# Patient Record
Sex: MALE | Race: WHITE | NOT HISPANIC OR LATINO
[De-identification: names, ages, dates, MRNs, and addresses within clinical notes are randomized per-mention and may not be internally consistent; named-entity substitution may affect disease eponyms.]

---

## 2021-05-10 PROBLEM — Z00.00 ENCOUNTER FOR PREVENTIVE HEALTH EXAMINATION: Status: ACTIVE | Noted: 2021-05-10

## 2021-06-14 ENCOUNTER — APPOINTMENT (OUTPATIENT)
Dept: OTOLARYNGOLOGY | Facility: CLINIC | Age: 56
End: 2021-06-14
Payer: MEDICAID

## 2021-06-14 ENCOUNTER — NON-APPOINTMENT (OUTPATIENT)
Age: 56
End: 2021-06-14

## 2021-06-14 VITALS
DIASTOLIC BLOOD PRESSURE: 97 MMHG | SYSTOLIC BLOOD PRESSURE: 150 MMHG | HEIGHT: 66.5 IN | BODY MASS INDEX: 25.41 KG/M2 | WEIGHT: 160 LBS | HEART RATE: 87 BPM | TEMPERATURE: 97.6 F

## 2021-06-14 PROCEDURE — 99204 OFFICE O/P NEW MOD 45 MIN: CPT | Mod: 25

## 2021-06-14 PROCEDURE — 99072 ADDL SUPL MATRL&STAF TM PHE: CPT

## 2021-06-14 PROCEDURE — 31237 NSL/SINS NDSC SURG BX POLYPC: CPT | Mod: 50

## 2021-06-14 RX ORDER — AMOXICILLIN 500 MG/1
500 CAPSULE ORAL
Qty: 28 | Refills: 0 | Status: ACTIVE | COMMUNITY
Start: 2021-04-23

## 2021-06-14 RX ORDER — PREDNISONE 10 MG/1
10 TABLET ORAL
Qty: 27 | Refills: 0 | Status: ACTIVE | COMMUNITY
Start: 2021-06-14 | End: 1900-01-01

## 2021-06-14 RX ORDER — OXYCODONE AND ACETAMINOPHEN 5; 325 MG/1; MG/1
5-325 TABLET ORAL
Qty: 20 | Refills: 0 | Status: ACTIVE | COMMUNITY
Start: 2021-04-30

## 2021-06-14 RX ORDER — AMOXICILLIN AND CLAVULANATE POTASSIUM 875; 125 MG/1; MG/1
875-125 TABLET, COATED ORAL
Qty: 28 | Refills: 0 | Status: ACTIVE | COMMUNITY
Start: 2021-04-23

## 2021-06-14 RX ORDER — IBUPROFEN 800 MG/1
800 TABLET, FILM COATED ORAL
Qty: 28 | Refills: 0 | Status: ACTIVE | COMMUNITY
Start: 2021-04-23

## 2021-06-14 RX ORDER — METHYLPREDNISOLONE 4 MG/1
4 TABLET ORAL
Qty: 21 | Refills: 0 | Status: ACTIVE | COMMUNITY
Start: 2021-04-18

## 2021-06-14 RX ORDER — AMLODIPINE BESYLATE 5 MG/1
5 TABLET ORAL
Qty: 90 | Refills: 0 | Status: ACTIVE | COMMUNITY
Start: 2021-04-29

## 2021-06-14 RX ORDER — AMOXICILLIN AND CLAVULANATE POTASSIUM 875; 125 MG/1; MG/1
875-125 TABLET, COATED ORAL TWICE DAILY
Qty: 28 | Refills: 0 | Status: ACTIVE | COMMUNITY
Start: 2021-06-14 | End: 1900-01-01

## 2021-06-14 RX ORDER — APREMILAST 30 MG/1
30 TABLET, FILM COATED ORAL
Qty: 60 | Refills: 0 | Status: ACTIVE | COMMUNITY
Start: 2021-04-21

## 2021-06-14 NOTE — PHYSICAL EXAM
[Midline] : trachea located in midline position [Normal] : no rashes [de-identified] : purulence right SMG on firm massage, unable to feel clear stone but gland inflamed, purulance cultured

## 2021-06-14 NOTE — CONSULT LETTER
[FreeTextEntry1] : Dear Dr. BRENDA MOODY \par I had the pleasure of evaluating your patient JEROD TIERNEY, thank you for allowing us to participate in their care. please see full note detailing our visit below.\par If you have any questions, please do not hesitate to call me and I would be happy to discuss further. \par \par Thien Spencer M.D.\par Attending Physician,  \par Department of Otolaryngology - Head and Neck Surgery\par [facility] \par Office: [phone]\par Fax: [phone]\par \par

## 2021-06-14 NOTE — PROCEDURE
[FreeTextEntry6] : procedure - bilateral endoscopic nasal  debridement\par Bilateral nasal cavities inspected with #0 ridged sinus endoscope. septum with massive crusting, abnormal mucosa. Bilateral middle meatuses were explored and everything cleared out suction and agitator airway cleared \par purulence left maxilalry

## 2021-06-14 NOTE — ASSESSMENT
[FreeTextEntry1] : pt with chronic sinusitis with nasl congestion with what looks like destroyed nasal lining, polyps\par purulence left maxillary \par We will proceed with a regiment of decongestants, antibiotics and irrigation to try to break the cycle of inflation and obstruction. this will treat the acute symptoms and will hopefully allow for maintenance therapy to reach disease areas and avoid further intervention.\par Ponaris oil\par saline gel\par get copy of images \par \par \par right SMG with 2 months sx, got one course of Tx - improved but not resolved still with issues\par abx and steroids\par Discussed options for recurrent submandibular sialoadenitis- observation with conservative management versus interventional sialoendoscopy vs excision of gland. \par Discussed details of the regiment/procedures and risks of each including but not limited to:\par interventional sialoendoscopy - bleeding, infection, scarring, inability to remove stone, ductal perforation/avulsion, injury to surrounding nerves, seroma, persistent sx \par Combined approach - increased risk of bleeding and infections injury to surrounding nerves including lingual, hypoglossal and sensory nerves as well as megaduct with large stone \par complete gland removal - external scar, injury to marginal mandibular nerve etc. \par Would like to do sialoendoscopy. Will book for procedure.\par will need to cool down infection first\par will see if can feel stone in duct next visit after Tx

## 2021-06-14 NOTE — HISTORY OF PRESENT ILLNESS
[de-identified] : pt with 2 months right sided SMG swelling \par sent for a CT -- .85 cm right smg stone, got abx in the ER as well\par 1.5 weeks went down, still giving pain intermittently \par not sure what brings it on \par is tender\par no dry mouth/ eyes\par re-doing teeth  \par \par massive nasal crusting, obstruction, sinu pressure and infection \par gets lots of crusts out\par Hx cocaine use and sinus surgery x2 last one in 2003\par sinus polyp disease on the scan

## 2021-06-23 LAB — BACTERIA FLD CULT: ABNORMAL

## 2021-08-05 ENCOUNTER — APPOINTMENT (OUTPATIENT)
Dept: OTOLARYNGOLOGY | Facility: CLINIC | Age: 56
End: 2021-08-05
Payer: MEDICAID

## 2021-08-05 VITALS
HEART RATE: 88 BPM | TEMPERATURE: 98 F | SYSTOLIC BLOOD PRESSURE: 145 MMHG | HEIGHT: 66.5 IN | WEIGHT: 160 LBS | DIASTOLIC BLOOD PRESSURE: 88 MMHG | BODY MASS INDEX: 25.41 KG/M2

## 2021-08-05 PROCEDURE — 31231 NASAL ENDOSCOPY DX: CPT

## 2021-08-05 PROCEDURE — 92557 COMPREHENSIVE HEARING TEST: CPT

## 2021-08-05 PROCEDURE — 99214 OFFICE O/P EST MOD 30 MIN: CPT | Mod: 25

## 2021-08-05 PROCEDURE — 92567 TYMPANOMETRY: CPT

## 2021-08-05 NOTE — CONSULT LETTER
[Please see my note below.] : Please see my note below. [FreeTextEntry1] : Dear Dr. BRENDA MOODY \par I had the pleasure of evaluating your patient JEROD TIERNEY, thank you for allowing us to participate in their care. please see full note detailing our visit below.\par If you have any questions, please do not hesitate to call me and I would be happy to discuss further. \par \par Thien Spencer M.D.\par Attending Physician,  \par Department of Otolaryngology - Head and Neck Surgery\par [facility] \par Office: [phone]\par Fax: [phone]\par \par

## 2021-08-05 NOTE — DATA REVIEWED
[de-identified] : R- Type As tymp. Hearing is essentially WNL with conductive components noted 250-2kHz \par L- Type B tymp. Mild -2kHz, moderate SNHL 4k-8kHz

## 2021-08-05 NOTE — PROCEDURE
[FreeTextEntry6] : Procedure performed: Nasal Endoscopy- Diagnostic\par Pre-op indication(s): nasal congestion\par Post-op indication(s): nasal congestion \par Verbal and/or written consent obtained from patient\par Anterior rhinoscopy insufficient to account for symptoms\par Scope #: 3,  flexible fiber optic telescope \par The scope was introduced in the nasal passage between the middle and inferior turbinates to exam the inferior portion of the middle meatus and the fontanelle, as well as the maxillary ostia.  Next, the scope was passed medically and posteriorly to the middle turbinates to examine the sphenoethmoid recess and the superior turbinate region.\par Upon visualization the finders are as follows:\par Nasal Septum: sigmoidal septal deviation\par Bilateral - Mucosa: boggy turbinates, Mucous: nasal mucosa lining destroyed, Polyp: not seen, Inferior Turbinate: boggy, Middle Turbinate: b/l BITH, Superior Turbinate: normal, Inferior Meatus: narrow, Middle Meatus: narrow, Super Meatus:normal, Sphenoethmoidal Recess: clear\par

## 2021-08-05 NOTE — END OF VISIT
[FreeTextEntry3] : I personally saw and examined JEROD TIERNEY in detail. I spoke to MALLORY Marvin regarding the assessment and plan of care.  I preformed the procedures and I reviewed the above assessment and plan of care, and agree. I have made changes in changes in the body of the note where appropriate.\par \par

## 2021-08-05 NOTE — HISTORY OF PRESENT ILLNESS
[de-identified] : pt with 5 months right sided SMG swelling. pt following up after taking abx, steroids but states feel the same.\par sent for a CT -- .85 cm right smg stone, got abx in the ER as well\par 1.5 weeks went down, still giving pain intermittently \par not sure what brings it on \par is tender\par +dry mouth, drinks a lot of water \par no dry mouth/ eyes\par re-doing teeth  \par pt denies autoimmune d/o \par \par massive nasal crusting, obstruction, sinu pressure and infection. Pt admits to about more than 5 sinus infection a year. Will use saline and other OTC meds for these infections as he tries not to take abx. \par gets lots of crusts out\par Hx cocaine use and sinus surgery x2 last one in 2003\par sinus polyp disease on the scan \par \par pt also with b/l otomastoiditis on CT scan- \par pt admits to hearing changes \par no Vertigo, tinnitus, drainage or facial weakness.\par

## 2021-08-05 NOTE — ASSESSMENT
[FreeTextEntry1] : pt with hx of previous surgery and cocaine use now with chronic sinusitis with nasl congestion with what looks like destroyed nasal lining, polyps, chornic chronicsting\par may be able to improve sx, thee is a chance that further opening could cause more crusting, previous sinus surgery sounds like it made crusting worse \par Ct imaged reviewed, significant sinusitis, would likely benefit from clearing \par Ponaris oil\par saline gel\par Risks benefits and alternatives of endoscopic sinus surgery with possible image guidance possible septoplasty bilateral inferior turbinate reduction discussed with patient at length. Risks discussed include but were not limited to bleeding, infection, persistent symptoms, scarring, injury to the skull base and brain and CSF leak, injury to orbit, crusting, septal hematoma, septal perforation results in whistling, crusting and bleeding as well as continued nasal obstruction etc. were discussed. Patient verbalized understanding of risks and benefits and also asked probing follow up questions which were answered to clarify details and get full understanding.\par pt with severe scan and crusting at baseline, would like to breath better and clear sinsues, he gets large crust out constantly as it is, more scarring and airflow introduces a risk of further crusting\par he understands there is a risk of getting worse but would like to try\par will be conservative but try to open up his airway \par \par \par right SMG stone 8.5 cm, got one course of Tx - s/p abx and steroids improved but not resolved still with issues \par CT with stone and sialoadenitis \par - Will proceed with regiment to increase salivary flow to reduce pooling in the gland and washout any possible obstruction if possible. Recommended hydration, regular massage, sour candies, and warm compress\par Discussed options for recurrent submandibular sialoadenitis- observation with conservative management versus interventional sialoendoscopy vs excision of gland. \par Discussed details of the regiment/procedures and risks of each including but not limited to:\par interventional sialoendoscopy - bleeding, infection, scarring, inability to remove stone, ductal perforation/avulsion, injury to surrounding nerves, seroma, persistent sx \par Combined approach - increased risk of bleeding and infections injury to surrounding nerves including lingual, hypoglossal and sensory nerves as well as megaduct with large stone \par complete gland removal - external scar, injury to marginal mandibular nerve etc. \par Would like to do sialoendoscopy. Will book for procedure.\par if a difficult size and very deep - will not be able to remove via the duct, cannot feel via the FOM \par will put possible laser\par understands there is a 60% chance of success, if cannot get out will need to remove the gland, after dicussion of the risks of gland removal he would like to try sialoendoscopy first understanding he may need to do more in the future \par \par left BRENDON and CHL\par Discussed risks, benefits and alternatives of myringotomy with tube placement including persistent hearing loss, injury to middle ear and mechanism of hearing, bleeding infection, scarring, retained tub, tympanic membrane perforation, tube otorrhea etc.\par he would like to hol doff on tube, will see if nasal Tx helps, feels hears OK and no infections, will monitor \par

## 2021-08-05 NOTE — PHYSICAL EXAM
[Midline] : trachea located in midline position [Normal] : no rashes [de-identified] : unable to feel clear stone [de-identified] : mobile TM right , left with fluid and decrease mobility

## 2021-10-06 ENCOUNTER — OUTPATIENT (OUTPATIENT)
Dept: OUTPATIENT SERVICES | Facility: HOSPITAL | Age: 56
LOS: 1 days | End: 2021-10-06
Payer: MEDICAID

## 2021-10-06 VITALS
OXYGEN SATURATION: 98 % | RESPIRATION RATE: 16 BRPM | WEIGHT: 166.01 LBS | HEIGHT: 67 IN | TEMPERATURE: 98 F | SYSTOLIC BLOOD PRESSURE: 128 MMHG | DIASTOLIC BLOOD PRESSURE: 82 MMHG | HEART RATE: 88 BPM

## 2021-10-06 DIAGNOSIS — K11.23 CHRONIC SIALOADENITIS: ICD-10-CM

## 2021-10-06 DIAGNOSIS — Z01.818 ENCOUNTER FOR OTHER PREPROCEDURAL EXAMINATION: ICD-10-CM

## 2021-10-06 DIAGNOSIS — I10 ESSENTIAL (PRIMARY) HYPERTENSION: ICD-10-CM

## 2021-10-06 DIAGNOSIS — Z98.890 OTHER SPECIFIED POSTPROCEDURAL STATES: Chronic | ICD-10-CM

## 2021-10-06 LAB
ANION GAP SERPL CALC-SCNC: 15 MMOL/L — SIGNIFICANT CHANGE UP (ref 5–17)
BUN SERPL-MCNC: 14 MG/DL — SIGNIFICANT CHANGE UP (ref 7–23)
CALCIUM SERPL-MCNC: 9.8 MG/DL — SIGNIFICANT CHANGE UP (ref 8.4–10.5)
CHLORIDE SERPL-SCNC: 104 MMOL/L — SIGNIFICANT CHANGE UP (ref 96–108)
CO2 SERPL-SCNC: 21 MMOL/L — LOW (ref 22–31)
CREAT SERPL-MCNC: 1.01 MG/DL — SIGNIFICANT CHANGE UP (ref 0.5–1.3)
GLUCOSE SERPL-MCNC: 118 MG/DL — HIGH (ref 70–99)
HCT VFR BLD CALC: 47.4 % — SIGNIFICANT CHANGE UP (ref 39–50)
HGB BLD-MCNC: 16.5 G/DL — SIGNIFICANT CHANGE UP (ref 13–17)
MCHC RBC-ENTMCNC: 29.2 PG — SIGNIFICANT CHANGE UP (ref 27–34)
MCHC RBC-ENTMCNC: 34.8 GM/DL — SIGNIFICANT CHANGE UP (ref 32–36)
MCV RBC AUTO: 83.9 FL — SIGNIFICANT CHANGE UP (ref 80–100)
NRBC # BLD: 0 /100 WBCS — SIGNIFICANT CHANGE UP (ref 0–0)
PLATELET # BLD AUTO: 323 K/UL — SIGNIFICANT CHANGE UP (ref 150–400)
POTASSIUM SERPL-MCNC: 4.1 MMOL/L — SIGNIFICANT CHANGE UP (ref 3.5–5.3)
POTASSIUM SERPL-SCNC: 4.1 MMOL/L — SIGNIFICANT CHANGE UP (ref 3.5–5.3)
RBC # BLD: 5.65 M/UL — SIGNIFICANT CHANGE UP (ref 4.2–5.8)
RBC # FLD: 13.9 % — SIGNIFICANT CHANGE UP (ref 10.3–14.5)
SODIUM SERPL-SCNC: 140 MMOL/L — SIGNIFICANT CHANGE UP (ref 135–145)
WBC # BLD: 11.24 K/UL — HIGH (ref 3.8–10.5)
WBC # FLD AUTO: 11.24 K/UL — HIGH (ref 3.8–10.5)

## 2021-10-06 PROCEDURE — 80048 BASIC METABOLIC PNL TOTAL CA: CPT

## 2021-10-06 PROCEDURE — G0463: CPT

## 2021-10-06 PROCEDURE — 85027 COMPLETE CBC AUTOMATED: CPT

## 2021-10-06 RX ORDER — SODIUM CHLORIDE 9 MG/ML
3 INJECTION INTRAMUSCULAR; INTRAVENOUS; SUBCUTANEOUS EVERY 8 HOURS
Refills: 0 | Status: DISCONTINUED | OUTPATIENT
Start: 2021-10-20 | End: 2021-11-03

## 2021-10-06 RX ORDER — LIDOCAINE HCL 20 MG/ML
0.2 VIAL (ML) INJECTION ONCE
Refills: 0 | Status: DISCONTINUED | OUTPATIENT
Start: 2021-10-20 | End: 2021-11-03

## 2021-10-06 NOTE — H&P PST ADULT - HISTORY OF PRESENT ILLNESS
56 year old male with hx of  56 year old male Current Smoker  with hx of HTN, Psoriasis ,Intestinal obstruction/ resection( with colostomy -reversed 2016 ), Cocaine use (2018)  ,Sinus surgery x2 last one in 2003  c/o massive nasal crusting, obstruction, sinus  pressure / infection and right side mandibular gland swelling since 4/2021 ( no relief even after taking A/B and steroids.).   CT revealed  .85 cm right submandibular gland  stone, sinus polyp disease, b/l otomastoiditis.    Scheduled for Functional Endoscopic Sinus Surgery with Medfusion Septoplasty, Bilateral  Inferior Turbinoplasty, Right Submandibular Sialoendoscopy Stone Removal , Possible Sialodochoplasty on 10/20/2021.      Denies Recent travel, Exposure or Covid symptoms  Covid test on 10/18/2021.

## 2021-10-06 NOTE — H&P PST ADULT - PROBLEM SELECTOR PLAN 1
Scheduled for Functional Endoscopic Sinus Surgery with Medfusion Septoplasty, Bilateral  Inferior Turbinoplasty, Right Submandibular Sialoendoscopy Stone Removal , Possible Sialodochoplasty on 10/20/2021.   Labs  Pre op education discussed.  Teach back performed.  Medical clearance pending

## 2021-10-06 NOTE — H&P PST ADULT - NSANTHOSAYNRD_GEN_A_CORE
No. RIYA screening performed.  STOP BANG Legend: 0-2 = LOW Risk; 3-4 = INTERMEDIATE Risk; 5-8 = HIGH Risk

## 2021-10-13 RX ORDER — PREDNISONE 10 MG/1
10 TABLET ORAL
Qty: 27 | Refills: 0 | Status: ACTIVE | COMMUNITY
Start: 2021-10-13 | End: 1900-01-01

## 2021-10-13 RX ORDER — AMOXICILLIN AND CLAVULANATE POTASSIUM 875; 125 MG/1; MG/1
875-125 TABLET, COATED ORAL
Qty: 20 | Refills: 0 | Status: ACTIVE | COMMUNITY
Start: 2021-10-13 | End: 1900-01-01

## 2021-10-15 PROBLEM — I10 ESSENTIAL (PRIMARY) HYPERTENSION: Chronic | Status: ACTIVE | Noted: 2021-10-06

## 2021-10-15 PROBLEM — L40.9 PSORIASIS, UNSPECIFIED: Chronic | Status: ACTIVE | Noted: 2021-10-06

## 2021-10-16 DIAGNOSIS — Z01.818 ENCOUNTER FOR OTHER PREPROCEDURAL EXAMINATION: ICD-10-CM

## 2021-10-17 ENCOUNTER — APPOINTMENT (OUTPATIENT)
Dept: DISASTER EMERGENCY | Facility: CLINIC | Age: 56
End: 2021-10-17

## 2021-10-18 LAB — SARS-COV-2 N GENE NPH QL NAA+PROBE: NOT DETECTED

## 2021-10-19 ENCOUNTER — TRANSCRIPTION ENCOUNTER (OUTPATIENT)
Age: 56
End: 2021-10-19

## 2021-10-20 ENCOUNTER — OUTPATIENT (OUTPATIENT)
Dept: OUTPATIENT SERVICES | Facility: HOSPITAL | Age: 56
LOS: 1 days | End: 2021-10-20
Payer: MEDICAID

## 2021-10-20 ENCOUNTER — RESULT REVIEW (OUTPATIENT)
Age: 56
End: 2021-10-20

## 2021-10-20 ENCOUNTER — APPOINTMENT (OUTPATIENT)
Dept: OTOLARYNGOLOGY | Facility: HOSPITAL | Age: 56
End: 2021-10-20

## 2021-10-20 VITALS
OXYGEN SATURATION: 96 % | DIASTOLIC BLOOD PRESSURE: 57 MMHG | RESPIRATION RATE: 18 BRPM | HEART RATE: 65 BPM | SYSTOLIC BLOOD PRESSURE: 110 MMHG

## 2021-10-20 VITALS
OXYGEN SATURATION: 97 % | HEART RATE: 68 BPM | TEMPERATURE: 98 F | RESPIRATION RATE: 16 BRPM | SYSTOLIC BLOOD PRESSURE: 120 MMHG | HEIGHT: 67 IN | WEIGHT: 166.01 LBS | DIASTOLIC BLOOD PRESSURE: 72 MMHG

## 2021-10-20 DIAGNOSIS — K11.23 CHRONIC SIALOADENITIS: ICD-10-CM

## 2021-10-20 DIAGNOSIS — Z98.890 OTHER SPECIFIED POSTPROCEDURAL STATES: Chronic | ICD-10-CM

## 2021-10-20 PROCEDURE — 88305 TISSUE EXAM BY PATHOLOGIST: CPT

## 2021-10-20 PROCEDURE — 31267 ENDOSCOPY MAXILLARY SINUS: CPT | Mod: 50

## 2021-10-20 PROCEDURE — 87102 FUNGUS ISOLATION CULTURE: CPT

## 2021-10-20 PROCEDURE — 88311 DECALCIFY TISSUE: CPT | Mod: 26

## 2021-10-20 PROCEDURE — 88300 SURGICAL PATH GROSS: CPT

## 2021-10-20 PROCEDURE — 42505 REPAIR SALIVARY DUCT: CPT | Mod: RT

## 2021-10-20 PROCEDURE — 87077 CULTURE AEROBIC IDENTIFY: CPT

## 2021-10-20 PROCEDURE — C9399: CPT

## 2021-10-20 PROCEDURE — 42500 REPAIR SALIVARY DUCT: CPT | Mod: RT

## 2021-10-20 PROCEDURE — 61782 SCAN PROC CRANIAL EXTRA: CPT

## 2021-10-20 PROCEDURE — 31253 NSL/SINS NDSC TOTAL: CPT | Mod: 50

## 2021-10-20 PROCEDURE — 87070 CULTURE OTHR SPECIMN AEROBIC: CPT

## 2021-10-20 PROCEDURE — 88300 SURGICAL PATH GROSS: CPT | Mod: 26,59

## 2021-10-20 PROCEDURE — 31288 NASAL/SINUS ENDOSCOPY SURG: CPT | Mod: 50

## 2021-10-20 PROCEDURE — 88311 DECALCIFY TISSUE: CPT

## 2021-10-20 PROCEDURE — 88305 TISSUE EXAM BY PATHOLOGIST: CPT | Mod: 26

## 2021-10-20 PROCEDURE — 87186 SC STD MICRODIL/AGAR DIL: CPT

## 2021-10-20 PROCEDURE — 31287 NASAL/SINUS ENDOSCOPY SURG: CPT | Mod: 50

## 2021-10-20 PROCEDURE — C1889: CPT

## 2021-10-20 PROCEDURE — 42335 REMOVAL OF SALIVARY STONE: CPT | Mod: RT

## 2021-10-20 PROCEDURE — 30140 RESECT INFERIOR TURBINATE: CPT | Mod: 50

## 2021-10-20 PROCEDURE — 42699B: CUSTOM

## 2021-10-20 PROCEDURE — 87075 CULTR BACTERIA EXCEPT BLOOD: CPT

## 2021-10-20 PROCEDURE — 87205 SMEAR GRAM STAIN: CPT

## 2021-10-20 RX ORDER — APREMILAST 10-20-30MG
1 KIT ORAL
Qty: 0 | Refills: 0 | DISCHARGE

## 2021-10-20 RX ORDER — OXYCODONE HYDROCHLORIDE 5 MG/1
5 TABLET ORAL ONCE
Refills: 0 | Status: DISCONTINUED | OUTPATIENT
Start: 2021-10-20 | End: 2021-10-20

## 2021-10-20 RX ORDER — ONDANSETRON 8 MG/1
4 TABLET, FILM COATED ORAL ONCE
Refills: 0 | Status: DISCONTINUED | OUTPATIENT
Start: 2021-10-20 | End: 2021-11-03

## 2021-10-20 RX ORDER — AMLODIPINE BESYLATE 2.5 MG/1
1 TABLET ORAL
Qty: 0 | Refills: 0 | DISCHARGE

## 2021-10-20 RX ORDER — FENTANYL CITRATE 50 UG/ML
25 INJECTION INTRAVENOUS
Refills: 0 | Status: DISCONTINUED | OUTPATIENT
Start: 2021-10-20 | End: 2021-10-20

## 2021-10-20 NOTE — ASU DISCHARGE PLAN (ADULT/PEDIATRIC) - CARE PROVIDER_API CALL
Thien Spencer (MD)  Otolaryngology  [address] [place] [address], [facility] [address]  [place] [address]  Phone: [phone]  Fax: [phone]  Follow Up Time:

## 2021-10-20 NOTE — ASU DISCHARGE PLAN (ADULT/PEDIATRIC) - ASU DC SPECIAL INSTRUCTIONSFT
No nose blowing for 2 weeks, cough/sneeze through an open mouth   No straining for two weeks  Complete antibiotics and steroids as directed on prescription  Pain medication as needed - do not drive, make decisions or operate machinery on pain meds. if constipates take stool softener, do not strain.   Expect oozing from your nose, you can change the "mustache dressing" as needed. If bleeding becomes brisk -  place Afrin (the nasal spray) on a cotton ball, put it in your nose, pinch and call my office/ go to the ED if does not slow in 10 minutes.  you can start using nasal saline wash tomorrow, 4 times a day if possible  I will see you in one week and clean everything out/ take out splints   Call with any concerns    keep saliva flowing to keep duct open and reduce risk of narrowing:  Massage gland every hour   ice for first 24 -48 hours, then can use warm compress  hydrate well  pain medication - Tylenol or Motrin as needed  expect swelling and discomfort  IF stent is placed, and falls out its okay.   follow up 1 week in the office  [facility] ALREADY CALLED IN SEE BELOW

## 2021-10-20 NOTE — BRIEF OPERATIVE NOTE - NSICDXBRIEFPREOP_GEN_ALL_CORE_FT
PRE-OP DIAGNOSIS:  Nasal congestion 20-Oct-2021 09:42:50  Gabriela Barboza  Sinusitis, chronic 20-Oct-2021 09:42:57  Gabriela Barboza  Sialoadenitis 20-Oct-2021 09:43:08  Gabriela Barboza

## 2021-10-20 NOTE — ASU DISCHARGE PLAN (ADULT/PEDIATRIC) - NURSING INSTRUCTIONS
Next dose of Tylenol will be on or after 1:45 pm___________ ,today/tonight, If needed for pain/cramps. Your first dose of Tylenol was given at _7:45am __________. Do not exceed more than 4000mg of Tylenol in one 24 hour setting.

## 2021-10-20 NOTE — PRE-ANESTHESIA EVALUATION ADULT - NSDENTALSD_ENT_ALL_CORE
Spoke with patient over the phone. Patient is independent with adls and iadls.  Patient lives with adult son. Patient denies any post hospital needs or services at this time.  Updated white board with 's name and number. Transitional Care Folder, Discharge Planning Begins on Admission pamphlet, [facility] Bedside Delivery pamphlet, Advance Directive information given to patient along with the contact information given.Instructed patient or family to call with any questions or concerns.    D/c plan:  Home  D/c transportation: daughter  [facility]  [facility]: declined  [facility]       03/15/20 3085   Discharge Assessment   Assessment Type Discharge Planning Assessment   Confirmed/corrected address and phone number on facesheet? Yes   Assessment information obtained from? Patient   Communicated expected length of stay with patient/caregiver yes   Prior to hospitilization cognitive status: Alert/Oriented   Prior to hospitalization functional status: Independent   Current cognitive status: Alert/Oriented   Current Functional Status: Independent   Lives With child(angela), adult   Is patient able to care for self after discharge? Yes   Who are your caregiver(s) and their phone number(s)? Ziyad Diez son [phone]   Patient's perception of discharge disposition home or selfcare   Readmission Within the Last 30 Days no previous admission in last 30 days   Patient currently being followed by outpatient case management? No   Patient currently receives any other outside agency services? No   Equipment Currently Used at Home none   Do you have any problems affording any of your prescribed medications? No   Is the patient taking medications as prescribed? yes   Does the patient have transportation home? Yes   Transportation Anticipated family or friend will provide   Does the patient receive services at the [facility]? No   Discharge Plan A Home with family    DME Needed Upon Discharge  none   Patient/Family in Agreement with Plan yes      None loose/missing teeth

## 2021-10-20 NOTE — BRIEF OPERATIVE NOTE - OPERATION/FINDINGS
Chronic sinusitis, deviated nasal septum Chronic sinusitis, deviated nasal septum, turbinate hyp Chronic sinusitis, deviated nasal septum, turbinate hypertrophy, with narrowed stenotic duct

## 2021-10-20 NOTE — BRIEF OPERATIVE NOTE - NSICDXBRIEFPOSTOP_GEN_ALL_CORE_FT
POST-OP DIAGNOSIS:  S/P FESS (functional endoscopic sinus surgery) 20-Oct-2021 09:43:21  Gabriela Barboza

## 2021-10-20 NOTE — BRIEF OPERATIVE NOTE - NSICDXBRIEFPROCEDURE_GEN_ALL_CORE_FT
PROCEDURES:  FESS, with nasal septoplasty and turbinectomy 20-Oct-2021 09:42:33  Gabriela Barboza  Sialoendoscopy, oral approach 20-Oct-2021 09:42:40  Gabriela Barboza

## 2021-10-23 LAB
-  AMPICILLIN/SULBACTAM: SIGNIFICANT CHANGE UP
-  CEFAZOLIN: SIGNIFICANT CHANGE UP
-  CLINDAMYCIN: SIGNIFICANT CHANGE UP
-  ERYTHROMYCIN: SIGNIFICANT CHANGE UP
-  GENTAMICIN: SIGNIFICANT CHANGE UP
-  OXACILLIN: SIGNIFICANT CHANGE UP
-  RIFAMPIN: SIGNIFICANT CHANGE UP
-  TETRACYCLINE: SIGNIFICANT CHANGE UP
-  TRIMETHOPRIM/SULFAMETHOXAZOLE: SIGNIFICANT CHANGE UP
-  VANCOMYCIN: SIGNIFICANT CHANGE UP
METHOD TYPE: SIGNIFICANT CHANGE UP

## 2021-10-25 LAB
CULTURE RESULTS: SIGNIFICANT CHANGE UP
ORGANISM # SPEC MICROSCOPIC CNT: SIGNIFICANT CHANGE UP
ORGANISM # SPEC MICROSCOPIC CNT: SIGNIFICANT CHANGE UP
SPECIMEN SOURCE: SIGNIFICANT CHANGE UP

## 2021-10-25 RX ORDER — OXYCODONE AND ACETAMINOPHEN 5; 325 MG/1; MG/1
5-325 TABLET ORAL
Qty: 12 | Refills: 0 | Status: ACTIVE | COMMUNITY
Start: 2021-10-13 | End: 1900-01-01

## 2021-10-28 ENCOUNTER — APPOINTMENT (OUTPATIENT)
Dept: OTOLARYNGOLOGY | Facility: CLINIC | Age: 56
End: 2021-10-28
Payer: MEDICAID

## 2021-10-28 VITALS
HEART RATE: 85 BPM | WEIGHT: 160 LBS | HEIGHT: 66.5 IN | DIASTOLIC BLOOD PRESSURE: 91 MMHG | BODY MASS INDEX: 25.41 KG/M2 | SYSTOLIC BLOOD PRESSURE: 140 MMHG | TEMPERATURE: 97.1 F

## 2021-10-28 LAB — SURGICAL PATHOLOGY STUDY: SIGNIFICANT CHANGE UP

## 2021-10-28 PROCEDURE — 99024 POSTOP FOLLOW-UP VISIT: CPT

## 2021-10-28 PROCEDURE — 31237 NSL/SINS NDSC SURG BX POLYPC: CPT | Mod: 50,58

## 2021-10-28 NOTE — PROCEDURE
[FreeTextEntry6] : procedure - bilateral endoscopic nasal  debridement\par Bilateral nasal cavities inspected with #0 ridged sinus endoscope. septum appeared midline and turbinates well reduced. bilateral middle meatuses were explored and suction and agitator were used to open ostiums and open any forming scar bands. all sinuses were explored and open at end of procedure\par nasal crusting cleared

## 2021-10-28 NOTE — CONSULT LETTER
[Please see my note below.] : Please see my note below. [FreeTextEntry1] : Dear Dr. BRENDA MOODY \par I had the pleasure of evaluating your patient JEROD TIERNEY, thank you for allowing us to participate in their care. please see full note detailing our visit below.\par If you have any questions, please do not hesitate to call me and I would be happy to discuss further. \par \par Thien Spencer M.D.\par Attending Physician,  \par Department of Otolaryngology - Head and Neck Surgery\par [facility] \par Office: [phone]\par Fax: [phone]\par \par

## 2021-10-28 NOTE — HISTORY OF PRESENT ILLNESS
[de-identified] : Pt following up s/p Sinus, turbs, R SMG sialoendoscopy 10/20/2021, septum was not addressed. Pt states has been doing the saline washes and has been taking meds. \par Pt also states with some crusting \par Pt denies clear fluid d/c

## 2021-10-28 NOTE — DATA REVIEWED
[de-identified] : R- Type As tymp. Hearing is essentially WNL with conductive components noted 250-2kHz \par L- Type B tymp. Mild -2kHz, moderate SNHL 4k-8kHz

## 2021-10-28 NOTE — ASSESSMENT
[FreeTextEntry1] : pt with hx of previous surgery and cocaine use now with chronic sinusitis with nasl congestion with what looks like destroyed nasal lining, polyps, chronic crusting\par may be able to improve sx, thee is a chance that further opening could cause more crusting, previous sinus surgery sounds like it made crusting worse \par Ct imaged reviewed, significant sinusitis, would likely benefit from clearing \par Ponaris oil\par saline gel\par 1 week s/p ESS and turbs, did not touch septum seems monomeric and scared. Spoke to pathologist reporting lots of metaplasia likely for history of heavy cocaine use \par \par \par right SMG stone 8.5 cm - removed via combined approach, stent removed today \par \par - Will proceed with regiment to increase salivary flow to reduce pooling in the gland and washout any possible obstruction if possible. Recommended hydration, regular massage, sour candies, and warm compress\par \par \par left BRENDON and CHL\par Discussed risks, benefits and alternatives of myringotomy with tube placement including persistent hearing loss, injury to middle ear and mechanism of hearing, bleeding infection, scarring, retained tub, tympanic membrane perforation, tube otorrhea etc.\par he would like to hol doff on tube, will see if nasal Tx helps, feels hears OK and no infections, will monitor \par

## 2021-11-16 ENCOUNTER — APPOINTMENT (OUTPATIENT)
Dept: OTOLARYNGOLOGY | Facility: CLINIC | Age: 56
End: 2021-11-16
Payer: MEDICAID

## 2021-11-16 VITALS
HEART RATE: 89 BPM | TEMPERATURE: 97.88 F | BODY MASS INDEX: 25.41 KG/M2 | SYSTOLIC BLOOD PRESSURE: 138 MMHG | HEIGHT: 66.5 IN | WEIGHT: 160 LBS | DIASTOLIC BLOOD PRESSURE: 86 MMHG

## 2021-11-16 DIAGNOSIS — J34.3 HYPERTROPHY OF NASAL TURBINATES: ICD-10-CM

## 2021-11-16 DIAGNOSIS — J32.0 CHRONIC MAXILLARY SINUSITIS: ICD-10-CM

## 2021-11-16 DIAGNOSIS — H90.3 SENSORINEURAL HEARING LOSS, BILATERAL: ICD-10-CM

## 2021-11-16 DIAGNOSIS — K11.23 CHRONIC SIALOADENITIS: ICD-10-CM

## 2021-11-16 PROCEDURE — 31237 NSL/SINS NDSC SURG BX POLYPC: CPT | Mod: 50,58

## 2021-11-16 PROCEDURE — 99024 POSTOP FOLLOW-UP VISIT: CPT

## 2021-11-16 NOTE — REASON FOR VISIT
[Subsequent Evaluation] : a subsequent evaluation for [FreeTextEntry2] : sialoadenitis, s/p surgery

## 2021-11-16 NOTE — CONSULT LETTER
[Please see my note below.] : Please see my note below. [FreeTextEntry1] : Dear Dr. BRENDA MOODY \par I had the pleasure of evaluating your patient JEROD TIERNEY, thank you for allowing us to participate in their care. please see full note detailing our visit below.\par If you have any questions, please do not hesitate to call me and I would be happy to discuss further. \par \par Thien Spencer M.D.\par Attending Physician,  \par Department of Otolaryngology - Head and Neck Surgery\par [facility] \par Office: [phone]\par Fax: [phone]\par \par

## 2021-11-16 NOTE — ASSESSMENT
[FreeTextEntry1] : pt with hx of previous surgery and cocaine use now with chronic sinusitis with nasl congestion with what looks like destroyed nasal lining, polyps, chronic crusting\par may be able to improve sx, thee is a chance that further opening could cause more crusting, previous sinus surgery sounds like it made crusting worse \par Ct imaged reviewed, significant sinusitis, would likely benefit from clearing \par Ponaris oil\par saline gel\par  s/p ESS and turbs, did not touch septum seems monomeric and scared. Spoke to pathologist reporting lots of metaplasia likely for history of heavy cocaine use \par \par \par right SMG stone 8.5 mm - removed via combined approach, good flow today, swelling continue to improve, pt gets no more swelling with eating \par - Will proceed with regiment to increase salivary flow to reduce pooling in the gland and washout any possible obstruction if possible. Recommended hydration, regular massage, sour candies, and warm compress\par \par \par left BRENDON and CHL\par Discussed risks, benefits and alternatives of myringotomy with tube placement including persistent hearing loss, injury to middle ear and mechanism of hearing, bleeding infection, scarring, retained tub, tympanic membrane perforation, tube otorrhea etc.\par he would like to hol doff on tube, will see if nasal Tx helps, feels hears OK and no infections, will monitor \par

## 2021-11-16 NOTE — PROCEDURE
Received refill request for Atorvastatin 80mg nightly.  LOV 02/01/21, NOV 07/08/21.   Lipid 03/29/21, CMP 12/23/20.   Refill sent to [facility] [place].  
[FreeTextEntry6] : procedure - bilateral endoscopic nasal  debridement\par Bilateral nasal cavities inspected with #0 ridged sinus endoscope. septum appeared midline and turbinates well reduced. bilateral middle meatuses were explored and suction and agitator were used to open ostiums and open any forming scar bands. all sinuses were explored and open at end of procedure\par nasal crusting cleared

## 2021-11-16 NOTE — DATA REVIEWED
[de-identified] : R- Type As tymp. Hearing is essentially WNL with conductive components noted 250-2kHz \par L- Type B tymp. Mild -2kHz, moderate SNHL 4k-8kHz

## 2021-11-20 LAB
CULTURE RESULTS: SIGNIFICANT CHANGE UP
SPECIMEN SOURCE: SIGNIFICANT CHANGE UP

## 2021-12-09 ENCOUNTER — APPOINTMENT (OUTPATIENT)
Dept: OTOLARYNGOLOGY | Facility: CLINIC | Age: 56
End: 2021-12-09

## 2022-01-03 ENCOUNTER — APPOINTMENT (OUTPATIENT)
Dept: OTOLARYNGOLOGY | Facility: CLINIC | Age: 57
End: 2022-01-03

## 2022-01-12 ENCOUNTER — APPOINTMENT (OUTPATIENT)
Dept: OTOLARYNGOLOGY | Facility: HOSPITAL | Age: 57
End: 2022-01-12

## 2022-11-28 ENCOUNTER — NON-APPOINTMENT (OUTPATIENT)
Age: 57
End: 2022-11-28
